# Patient Record
Sex: FEMALE
[De-identification: names, ages, dates, MRNs, and addresses within clinical notes are randomized per-mention and may not be internally consistent; named-entity substitution may affect disease eponyms.]

---

## 2024-04-23 PROBLEM — Z00.00 ENCOUNTER FOR PREVENTIVE HEALTH EXAMINATION: Status: ACTIVE | Noted: 2024-04-23

## 2024-04-24 ENCOUNTER — APPOINTMENT (OUTPATIENT)
Dept: ELECTROPHYSIOLOGY | Facility: CLINIC | Age: 69
End: 2024-04-24

## 2024-04-26 NOTE — HISTORY OF PRESENT ILLNESS
[FreeTextEntry1] : 69 year old woman with history of HTN, obesity, mod-severe aortic stenosis, and atrial fibrillation   She has a history of persistent AF, which has been treated with anticoagulation. (Eliquis 5mg bid) and rate control with diltiazem 360, and Toprol 25mg qd.  Holter monitor 7/31/23 revealed AF with average HR 86, range  bpm.  ECG in 12/7/23 revealed AF at 86 bpm with narrow QRS. She has had exertion dyspnea and palpitation, as well as lightheadedness. She has had episodes owner syncope.  Cardioversion had been recommended but she has been afraid to proceed.  Due to CHF symptoms he has been started on diuresis.   She has been active in her job, but does not do exercise.  Current meds include Eliquis 5mg bid, diltiazem 360mg qd, Toprol 50mg qd, irbesartan 300-12.5, lasix 20, KCL

## 2024-04-26 NOTE — CARDIOLOGY SUMMARY
[de-identified] : TTE 8/9/23 LVEF 55-60% LA 4.4cm, mild LVH, mild aortic insufficiency, mod AV stenosis (peak gradient 40, mean 24mmHG, SUPRIYA 0.99 cm2), mild MR, mod PI, RVSP 44

## 2024-04-26 NOTE — REASON FOR VISIT
[Arrhythmia/ECG Abnorrmalities] : arrhythmia/ECG abnormalities [FreeTextEntry3] : Dr Tejeda [FreeTextEntry1] : incomplete note